# Patient Record
Sex: FEMALE | Race: WHITE | NOT HISPANIC OR LATINO | Employment: UNEMPLOYED | URBAN - METROPOLITAN AREA
[De-identification: names, ages, dates, MRNs, and addresses within clinical notes are randomized per-mention and may not be internally consistent; named-entity substitution may affect disease eponyms.]

---

## 2017-08-03 RX ORDER — SERTRALINE HYDROCHLORIDE 100 MG/1
100 TABLET, FILM COATED ORAL DAILY
Qty: 30 TABLET | Refills: 11 | Status: SHIPPED | OUTPATIENT
Start: 2017-08-03 | End: 2018-07-27 | Stop reason: SDUPTHER

## 2017-08-03 RX ORDER — HYDROXYZINE PAMOATE 50 MG/1
CAPSULE ORAL
Refills: 0 | COMMUNITY
Start: 2017-05-10

## 2017-08-03 RX ORDER — HYDROXYZINE PAMOATE 50 MG/1
CAPSULE ORAL
Qty: 30 CAPSULE | Refills: 0 | OUTPATIENT
Start: 2017-08-03

## 2017-08-03 RX ORDER — LEVOTHYROXINE SODIUM 25 UG/1
25 TABLET ORAL DAILY
Qty: 30 TABLET | Refills: 11 | Status: SHIPPED | OUTPATIENT
Start: 2017-08-03 | End: 2018-07-27 | Stop reason: SDUPTHER

## 2017-10-09 RX ORDER — GABAPENTIN 400 MG/1
CAPSULE ORAL
Qty: 120 CAPSULE | Refills: 0 | Status: SHIPPED | OUTPATIENT
Start: 2017-10-09

## 2017-10-27 RX ORDER — GABAPENTIN 400 MG/1
CAPSULE ORAL
Qty: 120 CAPSULE | Refills: 0 | OUTPATIENT
Start: 2017-10-27

## 2017-11-21 RX ORDER — GABAPENTIN 400 MG/1
CAPSULE ORAL
Qty: 120 CAPSULE | Refills: 0 | OUTPATIENT
Start: 2017-11-21

## 2017-11-21 NOTE — TELEPHONE ENCOUNTER
----- Message from Reyna Best sent at 11/21/2017 10:52 AM CST -----  Contact: patient  Patient called requesting refill on gabapentin (NEURONTIN) 400 MG capsule and nasal spray send to [facility] pharmacy. Please call back at [phone] when script has been sent. Thanks,

## 2017-11-29 ENCOUNTER — TELEPHONE (OUTPATIENT)
Dept: PRIMARY CARE CLINIC | Facility: CLINIC | Age: 62
End: 2017-11-29

## 2017-11-29 NOTE — TELEPHONE ENCOUNTER
----- Message from Leonarda Tinajero sent at 11/28/2017  4:04 PM CST -----  Contact: Patient  Keira, patient [phone], Second time calling for refills on Rx    gabapentin (NEURONTIN) 400 MG capsule 120 capsule   Antihistamine nasal spray (doesn't know name)    Please advise. Thanks.      [facility] - [facility] [address]  [address]   [address] [place] [address]  Phone: [phone] Fax: [phone]

## 2018-07-27 RX ORDER — SERTRALINE HYDROCHLORIDE 100 MG/1
TABLET, FILM COATED ORAL
Qty: 30 TABLET | Refills: 11 | Status: SHIPPED | OUTPATIENT
Start: 2018-07-27

## 2018-07-27 RX ORDER — LEVOTHYROXINE SODIUM 25 UG/1
TABLET ORAL
Qty: 30 TABLET | Refills: 11 | Status: SHIPPED | OUTPATIENT
Start: 2018-07-27

## 2022-07-15 ENCOUNTER — NURSE TRIAGE (OUTPATIENT)
Dept: ADMINISTRATIVE | Facility: CLINIC | Age: 67
End: 2022-07-15

## 2022-07-16 NOTE — TELEPHONE ENCOUNTER
"  Reason for Disposition   [1] Age > 40 AND [2] no obvious cause AND [3] pain even when not moving the arm    (Exception: pain is clearly made worse by moving arm or bending neck)    Additional Information   Negative: Shock suspected (e.g., cold/pale/clammy skin, too weak to stand, low BP, rapid pulse)   Negative: [1] Similar pain previously AND [2] it was from "heart attack"   Negative: [1] Similar pain previously AND [2] it was from "angina" AND [3] not relieved by nitroglycerin   Negative: Sounds like a life-threatening emergency to the triager   Negative: Followed an arm injury   Negative: Chest pain   Negative: Pain, redness, or swelling at intravenous (IV) site or along course of vein   Negative: Wound looks infected   Negative: Elbow pain is main symptom   Negative: Wrist pain is main symptom   Negative: Difficulty breathing or unusual sweating (e.g., sweating without exertion)   Negative: [1] Age > 40 AND [2] associated chest or jaw pain AND [3] pain lasts > 5 minutes    Protocols used: ARM PAIN-A-AH   pt called re been having pblm with L arm swollen/pain, seen at [facility] 2-3 weeks ago. told to see ortho. swelling was main thing now is worse pain. first appt 8/14. pain goes down to hand and shoulder to neck. swelling gone down. few years ago fell and running and tripped and hit sofa and broke thumb. rec ED. Pt refused. Son had 800 mg lauro and thought to be rotator cuff. Pt states she will take 1/4 tab. Took 1/2 last pm. Pt on tylenol and excedrin. Pt states she will go to ED. Call back with questions.   "

## 2023-10-05 ENCOUNTER — HOSPITAL ENCOUNTER (OUTPATIENT)
Facility: HOSPITAL | Age: 68
Discharge: HOME | End: 2023-10-05
Attending: EMERGENCY MEDICINE | Admitting: STUDENT IN AN ORGANIZED HEALTH CARE EDUCATION/TRAINING PROGRAM
Payer: MEDICARE

## 2023-10-05 VITALS
DIASTOLIC BLOOD PRESSURE: 57 MMHG | SYSTOLIC BLOOD PRESSURE: 107 MMHG | WEIGHT: 119.81 LBS | OXYGEN SATURATION: 95 % | HEART RATE: 76 BPM | TEMPERATURE: 99 F | RESPIRATION RATE: 18 BRPM

## 2023-10-05 DIAGNOSIS — R07.9 CHEST PAIN: ICD-10-CM

## 2023-10-05 DIAGNOSIS — K62.3 RECTAL PROLAPSE: Primary | ICD-10-CM

## 2023-10-05 DIAGNOSIS — D75.839 THROMBOCYTOSIS: ICD-10-CM

## 2023-10-05 PROBLEM — F41.9 ANXIETY: Status: ACTIVE | Noted: 2022-08-11

## 2023-10-05 PROBLEM — E03.9 HYPOTHYROIDISM: Status: ACTIVE | Noted: 2022-08-15

## 2023-10-05 PROBLEM — K21.9 GASTROESOPHAGEAL REFLUX DISEASE WITHOUT ESOPHAGITIS: Status: ACTIVE | Noted: 2022-08-11

## 2023-10-05 LAB
ALBUMIN SERPL BCP-MCNC: 4.1 G/DL (ref 3.5–5.2)
ALP SERPL-CCNC: 89 U/L (ref 55–135)
ALT SERPL W/O P-5'-P-CCNC: 16 U/L (ref 10–44)
AMPHET+METHAMPHET UR QL: ABNORMAL
ANION GAP SERPL CALC-SCNC: 15 MMOL/L (ref 8–16)
AST SERPL-CCNC: 26 U/L (ref 10–40)
BARBITURATES UR QL SCN>200 NG/ML: NEGATIVE
BASOPHILS # BLD AUTO: 0.08 K/UL (ref 0–0.2)
BASOPHILS # BLD AUTO: 0.08 K/UL (ref 0–0.2)
BASOPHILS NFR BLD: 0.6 % (ref 0–1.9)
BASOPHILS NFR BLD: 0.7 % (ref 0–1.9)
BENZODIAZ UR QL SCN>200 NG/ML: NEGATIVE
BILIRUB SERPL-MCNC: 0.5 MG/DL (ref 0.1–1)
BUN SERPL-MCNC: 25 MG/DL (ref 8–23)
BZE UR QL SCN: NEGATIVE
CALCIUM SERPL-MCNC: 9.6 MG/DL (ref 8.7–10.5)
CANNABINOIDS UR QL SCN: NEGATIVE
CHLORIDE SERPL-SCNC: 104 MMOL/L (ref 95–110)
CO2 SERPL-SCNC: 23 MMOL/L (ref 23–29)
CREAT SERPL-MCNC: 0.9 MG/DL (ref 0.5–1.4)
CREAT UR-MCNC: 99.6 MG/DL (ref 15–325)
DIFFERENTIAL METHOD: ABNORMAL
DIFFERENTIAL METHOD: ABNORMAL
EOSINOPHIL # BLD AUTO: 0.1 K/UL (ref 0–0.5)
EOSINOPHIL # BLD AUTO: 0.2 K/UL (ref 0–0.5)
EOSINOPHIL NFR BLD: 1.1 % (ref 0–8)
EOSINOPHIL NFR BLD: 1.5 % (ref 0–8)
ERYTHROCYTE [DISTWIDTH] IN BLOOD BY AUTOMATED COUNT: 14.8 % (ref 11.5–14.5)
ERYTHROCYTE [DISTWIDTH] IN BLOOD BY AUTOMATED COUNT: 15 % (ref 11.5–14.5)
EST. GFR  (NO RACE VARIABLE): >60 ML/MIN/1.73 M^2
GLUCOSE SERPL-MCNC: 83 MG/DL (ref 70–110)
HCT VFR BLD AUTO: 36.7 % (ref 37–48.5)
HCT VFR BLD AUTO: 40.2 % (ref 37–48.5)
HGB BLD-MCNC: 11.8 G/DL (ref 12–16)
HGB BLD-MCNC: 12.9 G/DL (ref 12–16)
IMM GRANULOCYTES # BLD AUTO: 0.03 K/UL (ref 0–0.04)
IMM GRANULOCYTES # BLD AUTO: 0.05 K/UL (ref 0–0.04)
IMM GRANULOCYTES NFR BLD AUTO: 0.2 % (ref 0–0.5)
IMM GRANULOCYTES NFR BLD AUTO: 0.4 % (ref 0–0.5)
LIPASE SERPL-CCNC: 6 U/L (ref 4–60)
LYMPHOCYTES # BLD AUTO: 2.3 K/UL (ref 1–4.8)
LYMPHOCYTES # BLD AUTO: 2.5 K/UL (ref 1–4.8)
LYMPHOCYTES NFR BLD: 18.9 % (ref 18–48)
LYMPHOCYTES NFR BLD: 18.9 % (ref 18–48)
MCH RBC QN AUTO: 30.2 PG (ref 27–31)
MCH RBC QN AUTO: 30.3 PG (ref 27–31)
MCHC RBC AUTO-ENTMCNC: 32.1 G/DL (ref 32–36)
MCHC RBC AUTO-ENTMCNC: 32.2 G/DL (ref 32–36)
MCV RBC AUTO: 94 FL (ref 82–98)
MCV RBC AUTO: 94 FL (ref 82–98)
METHADONE UR QL SCN>300 NG/ML: NEGATIVE
MONOCYTES # BLD AUTO: 0.7 K/UL (ref 0.3–1)
MONOCYTES # BLD AUTO: 0.7 K/UL (ref 0.3–1)
MONOCYTES NFR BLD: 5.6 % (ref 4–15)
MONOCYTES NFR BLD: 5.6 % (ref 4–15)
NEUTROPHILS # BLD AUTO: 9 K/UL (ref 1.8–7.7)
NEUTROPHILS # BLD AUTO: 9.5 K/UL (ref 1.8–7.7)
NEUTROPHILS NFR BLD: 73.1 % (ref 38–73)
NEUTROPHILS NFR BLD: 73.4 % (ref 38–73)
NRBC BLD-RTO: 0 /100 WBC
NRBC BLD-RTO: 0 /100 WBC
OPIATES UR QL SCN: ABNORMAL
PCP UR QL SCN>25 NG/ML: NEGATIVE
PLATELET # BLD AUTO: 1148 K/UL (ref 150–450)
PLATELET # BLD AUTO: 1181 K/UL (ref 150–450)
PLATELET BLD QL SMEAR: ABNORMAL
PMV BLD AUTO: 9.1 FL (ref 9.2–12.9)
PMV BLD AUTO: 9.5 FL (ref 9.2–12.9)
POTASSIUM SERPL-SCNC: 4 MMOL/L (ref 3.5–5.1)
PROT SERPL-MCNC: 7.4 G/DL (ref 6–8.4)
RBC # BLD AUTO: 3.89 M/UL (ref 4–5.4)
RBC # BLD AUTO: 4.27 M/UL (ref 4–5.4)
SODIUM SERPL-SCNC: 142 MMOL/L (ref 136–145)
TOXICOLOGY INFORMATION: ABNORMAL
WBC # BLD AUTO: 12.28 K/UL (ref 3.9–12.7)
WBC # BLD AUTO: 12.97 K/UL (ref 3.9–12.7)

## 2023-10-05 PROCEDURE — 85025 COMPLETE CBC W/AUTO DIFF WBC: CPT | Mod: 91 | Performed by: EMERGENCY MEDICINE

## 2023-10-05 PROCEDURE — G0378 HOSPITAL OBSERVATION PER HR: HCPCS

## 2023-10-05 PROCEDURE — A9698 NON-RAD CONTRAST MATERIALNOC: HCPCS | Performed by: EMERGENCY MEDICINE

## 2023-10-05 PROCEDURE — 63600175 PHARM REV CODE 636 W HCPCS: Performed by: EMERGENCY MEDICINE

## 2023-10-05 PROCEDURE — 80053 COMPREHEN METABOLIC PANEL: CPT | Performed by: EMERGENCY MEDICINE

## 2023-10-05 PROCEDURE — 99222 1ST HOSP IP/OBS MODERATE 55: CPT | Mod: ,,, | Performed by: STUDENT IN AN ORGANIZED HEALTH CARE EDUCATION/TRAINING PROGRAM

## 2023-10-05 PROCEDURE — 96376 TX/PRO/DX INJ SAME DRUG ADON: CPT

## 2023-10-05 PROCEDURE — 96375 TX/PRO/DX INJ NEW DRUG ADDON: CPT

## 2023-10-05 PROCEDURE — 83690 ASSAY OF LIPASE: CPT | Performed by: EMERGENCY MEDICINE

## 2023-10-05 PROCEDURE — 80307 DRUG TEST PRSMV CHEM ANLYZR: CPT | Performed by: EMERGENCY MEDICINE

## 2023-10-05 PROCEDURE — 99222 PR INITIAL HOSPITAL CARE,LEVL II: ICD-10-PCS | Mod: ,,, | Performed by: STUDENT IN AN ORGANIZED HEALTH CARE EDUCATION/TRAINING PROGRAM

## 2023-10-05 PROCEDURE — 99285 EMERGENCY DEPT VISIT HI MDM: CPT | Mod: 25

## 2023-10-05 PROCEDURE — 96374 THER/PROPH/DIAG INJ IV PUSH: CPT | Mod: 59

## 2023-10-05 PROCEDURE — P9612 CATHETERIZE FOR URINE SPEC: HCPCS

## 2023-10-05 PROCEDURE — 96361 HYDRATE IV INFUSION ADD-ON: CPT

## 2023-10-05 PROCEDURE — 25500020 PHARM REV CODE 255: Performed by: EMERGENCY MEDICINE

## 2023-10-05 RX ORDER — SODIUM CHLORIDE 0.9 % (FLUSH) 0.9 %
10 SYRINGE (ML) INJECTION EVERY 12 HOURS PRN
Status: DISCONTINUED | OUTPATIENT
Start: 2023-10-05 | End: 2023-10-06 | Stop reason: HOSPADM

## 2023-10-05 RX ORDER — GLUCAGON 1 MG
1 KIT INJECTION
Status: DISCONTINUED | OUTPATIENT
Start: 2023-10-05 | End: 2023-10-06 | Stop reason: HOSPADM

## 2023-10-05 RX ORDER — GABAPENTIN 300 MG/1
300 CAPSULE ORAL 3 TIMES DAILY
Status: DISCONTINUED | OUTPATIENT
Start: 2023-10-05 | End: 2023-10-05

## 2023-10-05 RX ORDER — ONDANSETRON 2 MG/ML
4 INJECTION INTRAMUSCULAR; INTRAVENOUS
Status: COMPLETED | OUTPATIENT
Start: 2023-10-05 | End: 2023-10-05

## 2023-10-05 RX ORDER — LEVOTHYROXINE SODIUM 25 UG/1
25 TABLET ORAL
Status: DISCONTINUED | OUTPATIENT
Start: 2023-10-06 | End: 2023-10-06 | Stop reason: HOSPADM

## 2023-10-05 RX ORDER — LORAZEPAM 2 MG/ML
1 INJECTION INTRAMUSCULAR
Status: COMPLETED | OUTPATIENT
Start: 2023-10-05 | End: 2023-10-05

## 2023-10-05 RX ORDER — BUPROPION HYDROCHLORIDE 150 MG/1
300 TABLET ORAL
COMMUNITY
Start: 2023-10-05

## 2023-10-05 RX ORDER — BUSPIRONE HYDROCHLORIDE 5 MG/1
5 TABLET ORAL 3 TIMES DAILY
Status: DISCONTINUED | OUTPATIENT
Start: 2023-10-05 | End: 2023-10-05

## 2023-10-05 RX ORDER — BUPROPION HYDROCHLORIDE 150 MG/1
300 TABLET ORAL DAILY
Status: DISCONTINUED | OUTPATIENT
Start: 2023-10-06 | End: 2023-10-06 | Stop reason: HOSPADM

## 2023-10-05 RX ORDER — MORPHINE SULFATE 4 MG/ML
4 INJECTION, SOLUTION INTRAMUSCULAR; INTRAVENOUS
Status: COMPLETED | OUTPATIENT
Start: 2023-10-05 | End: 2023-10-05

## 2023-10-05 RX ORDER — PANTOPRAZOLE SODIUM 40 MG/1
1 TABLET, DELAYED RELEASE ORAL DAILY
COMMUNITY
Start: 2023-09-06

## 2023-10-05 RX ORDER — ONDANSETRON HYDROCHLORIDE 8 MG/1
1 TABLET, FILM COATED ORAL EVERY 8 HOURS PRN
COMMUNITY
Start: 2023-10-03

## 2023-10-05 RX ORDER — IBUPROFEN 200 MG
16 TABLET ORAL
Status: DISCONTINUED | OUTPATIENT
Start: 2023-10-05 | End: 2023-10-06 | Stop reason: HOSPADM

## 2023-10-05 RX ORDER — NALOXONE HCL 0.4 MG/ML
0.02 VIAL (ML) INJECTION
Status: DISCONTINUED | OUTPATIENT
Start: 2023-10-05 | End: 2023-10-06 | Stop reason: HOSPADM

## 2023-10-05 RX ORDER — MORPHINE SULFATE 4 MG/ML
2 INJECTION, SOLUTION INTRAMUSCULAR; INTRAVENOUS EVERY 4 HOURS PRN
Status: DISCONTINUED | OUTPATIENT
Start: 2023-10-05 | End: 2023-10-06 | Stop reason: HOSPADM

## 2023-10-05 RX ORDER — BUSPIRONE HYDROCHLORIDE 5 MG/1
5 TABLET ORAL 3 TIMES DAILY
COMMUNITY
Start: 2023-10-05

## 2023-10-05 RX ORDER — IBUPROFEN 200 MG
24 TABLET ORAL
Status: DISCONTINUED | OUTPATIENT
Start: 2023-10-05 | End: 2023-10-06 | Stop reason: HOSPADM

## 2023-10-05 RX ORDER — ACETAMINOPHEN 325 MG/1
650 TABLET ORAL EVERY 8 HOURS PRN
Status: DISCONTINUED | OUTPATIENT
Start: 2023-10-05 | End: 2023-10-06 | Stop reason: HOSPADM

## 2023-10-05 RX ADMIN — IOHEXOL 100 ML: 350 INJECTION, SOLUTION INTRAVENOUS at 04:10

## 2023-10-05 RX ADMIN — SODIUM CHLORIDE, POTASSIUM CHLORIDE, SODIUM LACTATE AND CALCIUM CHLORIDE 1000 ML: 600; 310; 30; 20 INJECTION, SOLUTION INTRAVENOUS at 08:10

## 2023-10-05 RX ADMIN — MORPHINE SULFATE 4 MG: 4 INJECTION INTRAVENOUS at 02:10

## 2023-10-05 RX ADMIN — MORPHINE SULFATE 4 MG: 4 INJECTION INTRAVENOUS at 05:10

## 2023-10-05 RX ADMIN — IOHEXOL 500 ML: 9 SOLUTION ORAL at 04:10

## 2023-10-05 RX ADMIN — LORAZEPAM 1 MG: 2 INJECTION INTRAMUSCULAR; INTRAVENOUS at 05:10

## 2023-10-05 RX ADMIN — ONDANSETRON 4 MG: 2 INJECTION INTRAMUSCULAR; INTRAVENOUS at 02:10

## 2023-10-05 NOTE — Clinical Note
Diagnosis: Rectal prolapse [569.1.ICD-9-CM]   Future Attending Provider: SAMI POSADA [[id number]]   Admitting Provider:: SAMI POSADA [[id number]]

## 2023-10-05 NOTE — ED PROVIDER NOTES
"SCRIBE #1 NOTE: I, Efrainolu Howard, am scribing for, and in the presence of, Kristen Salazar DO. I have scribed the entire note.      History      Chief Complaint   Patient presents with    Rectal Pain     Pt states there is a ball "sticking out" of her rectum       Review of patient's allergies indicates:  No Known Allergies     HPI   HPI    10/5/2023, 2:06 PM   History obtained from the patient      History of Present Illness: Keira Acosta is a 68 y.o. female patient who presents to the Emergency Department for rectal pain, onset last night. Pt describes the pain as a burning sensation. Pt states that she has been constipated for the past several days, having to strain to have a BM. Reports 4 vaginal deliveries. Symptoms are constant and severe. No mitigating or exacerbating factors reported. Associated sxs include constipation and nausea. Patient denies any fever, vomiting, SOB, CP, abdominal pain, and all other sxs at this time. No prior Tx reported. No further complaints or concerns at this time.       Arrival mode: EMS     PCP: No, Primary Doctor       Past Medical History:  Past Medical History:   Diagnosis Date    Acid reflux     Carcinoma of uterus     Depression     Diverticulosis     Hypoglycemia     Hypothyroid     Migraine headache     Panic attacks     Stomach ulcer     Thyroid disease        Past Surgical History:  Past Surgical History:   Procedure Laterality Date    HYSTERECTOMY      PARTIAL HYSTERECTOMY      TONSILLECTOMY           Family History:  Family History   Problem Relation Age of Onset    Cancer Mother     Cancer Father        Social History:  Social History     Tobacco Use    Smoking status: Every Day     Current packs/day: 0.50     Types: Cigarettes    Smokeless tobacco: Not on file   Substance and Sexual Activity    Alcohol use: No    Drug use: No    Sexual activity: Not on file       ROS   Review of Systems   Constitutional:  Negative for fever.   Respiratory:  Negative for shortness " of breath.    Cardiovascular:  Negative for chest pain.   Gastrointestinal:  Positive for constipation, nausea and rectal pain (burning). Negative for vomiting.     Physical Exam      Initial Vitals [10/05/23 1117]   BP Pulse Resp Temp SpO2   126/68 82 18 98.4 °F (36.9 °C) 98 %      MAP       --          Physical Exam  Nursing Notes and Vital Signs Reviewed.  Constitutional: Patient is in mild distress. Well-developed and well-nourished.  Head: Atraumatic. Normocephalic.  Eyes: PERRL. EOM intact. Conjunctivae are not pale. No scleral icterus.  ENT: Mucous membranes are moist. Oropharynx is clear and symmetric.    Neck: Supple. Full ROM.  Cardiovascular: Regular rate. Regular rhythm. No murmurs, rubs, or gallops. Distal pulses are 2+ and symmetric.  Pulmonary/Chest: No respiratory distress. Clear to auscultation bilaterally. No wheezing or rales.  Rectal: Female chaperone present for the duration of the rectal exam. Rectal prolapse with areas of irritation to the rectal mucosa with scant bleeding, + flatus.   Abdominal: Soft and non-distended.  There is no tenderness.  No rebound, guarding, or rigidity.   Musculoskeletal: Moves all extremities. No obvious deformities. No edema.  Skin: Warm and dry.  Neurological:  Alert, awake, and anxious.  No acute focal neurological deficits are appreciated.  Psychiatric: Anxious.    ED Course    Procedures  ED Vital Signs:  Vitals:    10/05/23 1117 10/05/23 1209 10/05/23 1445 10/05/23 1555   BP: 126/68 131/70  110/68   Pulse: 82 83  79   Resp: 18 17 17 19   Temp: 98.4 °F (36.9 °C)   98.6 °F (37 °C)   TempSrc: Oral   Oral   SpO2: 98% 99%  98%   Weight:  54.3 kg (119 lb 12.8 oz)      10/05/23 1615 10/05/23 1700 10/05/23 1721 10/05/23 1748   BP: (!) 127/58 120/67  126/67   Pulse: 83 80  83   Resp: 18 18 18 18   Temp:  98.7 °F (37.1 °C)  98.6 °F (37 °C)   TempSrc:  Oral  Oral   SpO2: 98% 96%     Weight:        10/05/23 2048 10/05/23 2202   BP: 120/65 (!) 107/57   Pulse: 76 76   Resp:      Temp:     TempSrc:     SpO2: 95% 95%   Weight:         Abnormal Lab Results:  Labs Reviewed   CBC W/ AUTO DIFFERENTIAL - Abnormal; Notable for the following components:       Result Value    WBC 12.97 (*)     RDW 15.0 (*)     Platelets 1,148 (*)     MPV 9.1 (*)     Gran # (ANC) 9.5 (*)     Immature Grans (Abs) 0.05 (*)     Gran % 73.4 (*)     Platelet Estimate Increased (*)     All other components within normal limits    Narrative:     PLT critical result(s) called and verbal readback obtained from   GIFTY SHABAZZ RN by [employer] 10/05/2023 14:11   COMPREHENSIVE METABOLIC PANEL - Abnormal; Notable for the following components:    BUN 25 (*)     All other components within normal limits   CBC W/ AUTO DIFFERENTIAL - Abnormal; Notable for the following components:    RBC 3.89 (*)     Hemoglobin 11.8 (*)     Hematocrit 36.7 (*)     RDW 14.8 (*)     Platelets 1,181 (*)     Gran # (ANC) 9.0 (*)     Gran % 73.1 (*)     All other components within normal limits    Narrative:     PLT critical result(s) called and verbal readback obtained from   TYLER RIVERA RN by [employer] 10/05/2023 15:17   DRUG SCREEN PANEL, URINE EMERGENCY - Abnormal; Notable for the following components:    Opiate Scrn, Ur Presumptive Positive (*)     Amphetamine Screen, Ur Presumptive Positive (*)     All other components within normal limits    Narrative:     Specimen Source->Urine   LIPASE   LIPASE   TSH        All Lab Results:  Results for orders placed or performed during the hospital encounter of 10/05/23   CBC auto differential   Result Value Ref Range    WBC 12.97 (H) 3.90 - 12.70 K/uL    RBC 4.27 4.00 - 5.40 M/uL    Hemoglobin 12.9 12.0 - 16.0 g/dL    Hematocrit 40.2 37.0 - 48.5 %    MCV 94 82 - 98 fL    MCH 30.2 27.0 - 31.0 pg    MCHC 32.1 32.0 - 36.0 g/dL    RDW 15.0 (H) 11.5 - 14.5 %    Platelets 1,148 (HH) 150 - 450 K/uL    MPV 9.1 (L) 9.2 - 12.9 fL    Immature Granulocytes 0.4 0.0 - 0.5 %    Gran # (ANC) 9.5 (H) 1.8 - 7.7 K/uL    Immature  Grans (Abs) 0.05 (H) 0.00 - 0.04 K/uL    Lymph # 2.5 1.0 - 4.8 K/uL    Mono # 0.7 0.3 - 1.0 K/uL    Eos # 0.1 0.0 - 0.5 K/uL    Baso # 0.08 0.00 - 0.20 K/uL    nRBC 0 0 /100 WBC    Gran % 73.4 (H) 38.0 - 73.0 %    Lymph % 18.9 18.0 - 48.0 %    Mono % 5.6 4.0 - 15.0 %    Eosinophil % 1.1 0.0 - 8.0 %    Basophil % 0.6 0.0 - 1.9 %    Platelet Estimate Increased (A)     Differential Method Automated    Comprehensive metabolic panel   Result Value Ref Range    Sodium 142 136 - 145 mmol/L    Potassium 4.0 3.5 - 5.1 mmol/L    Chloride 104 95 - 110 mmol/L    CO2 23 23 - 29 mmol/L    Glucose 83 70 - 110 mg/dL    BUN 25 (H) 8 - 23 mg/dL    Creatinine 0.9 0.5 - 1.4 mg/dL    Calcium 9.6 8.7 - 10.5 mg/dL    Total Protein 7.4 6.0 - 8.4 g/dL    Albumin 4.1 3.5 - 5.2 g/dL    Total Bilirubin 0.5 0.1 - 1.0 mg/dL    Alkaline Phosphatase 89 55 - 135 U/L    AST 26 10 - 40 U/L    ALT 16 10 - 44 U/L    eGFR >60 >60 mL/min/1.73 m^2    Anion Gap 15 8 - 16 mmol/L   CBC auto differential   Result Value Ref Range    WBC 12.28 3.90 - 12.70 K/uL    RBC 3.89 (L) 4.00 - 5.40 M/uL    Hemoglobin 11.8 (L) 12.0 - 16.0 g/dL    Hematocrit 36.7 (L) 37.0 - 48.5 %    MCV 94 82 - 98 fL    MCH 30.3 27.0 - 31.0 pg    MCHC 32.2 32.0 - 36.0 g/dL    RDW 14.8 (H) 11.5 - 14.5 %    Platelets 1,181 (HH) 150 - 450 K/uL    MPV 9.5 9.2 - 12.9 fL    Immature Granulocytes 0.2 0.0 - 0.5 %    Gran # (ANC) 9.0 (H) 1.8 - 7.7 K/uL    Immature Grans (Abs) 0.03 0.00 - 0.04 K/uL    Lymph # 2.3 1.0 - 4.8 K/uL    Mono # 0.7 0.3 - 1.0 K/uL    Eos # 0.2 0.0 - 0.5 K/uL    Baso # 0.08 0.00 - 0.20 K/uL    nRBC 0 0 /100 WBC    Gran % 73.1 (H) 38.0 - 73.0 %    Lymph % 18.9 18.0 - 48.0 %    Mono % 5.6 4.0 - 15.0 %    Eosinophil % 1.5 0.0 - 8.0 %    Basophil % 0.7 0.0 - 1.9 %    Differential Method Automated    Lipase   Result Value Ref Range    Lipase 6 4 - 60 U/L   Drug screen panel, emergency   Result Value Ref Range    Benzodiazepines Negative Negative    Methadone metabolites Negative  Negative    Cocaine (Metab.) Negative Negative    Opiate Scrn, Ur Presumptive Positive (A) Negative    Barbiturate Screen, Ur Negative Negative    Amphetamine Screen, Ur Presumptive Positive (A) Negative    THC Negative Negative    Phencyclidine Negative Negative    Creatinine, Urine 99.6 15.0 - 325.0 mg/dL    Toxicology Information SEE COMMENT      Imaging Results:  Imaging Results               CT Abdomen Pelvis With Contrast (Final result)  Result time 10/05/23 16:44:22      Final result by Kingston Diaz MD (10/05/23 16:44:22)                   Impression:      Ongoing rectal prolapse as above.    Gallbladder distension of unclear significance.  Common bile duct dilation.  Outpatient consideration of ERCP depending on symptoms.  Correlation with prior imaging to establish stability should be considered.    Complete findings as above.    This report was flagged in Epic as abnormal.    All CT scans at this facility are performed  using dose modulation techniques as appropriate to performed exam including the following:  automated exposure control; adjustment of mA and/or kV according to the patients size (this includes techniques or standardized protocols for targeted exams where dose is matched to indication/reason for exam: i.e. extremities or head);  iterative reconstruction technique.      Electronically signed by: Kingston Diaz  Date:    10/05/2023  Time:    16:44               Narrative:    EXAMINATION:  CT ABDOMEN PELVIS WITH CONTRAST    CLINICAL HISTORY:  Bowel obstruction suspected;rectal prolapse;    TECHNIQUE:  Low dose axial images, sagittal and coronal reformations were obtained from the lung bases to the pubic symphysis.  Contrast was not administered.    COMPARISON:  None    FINDINGS:  Heart: Normal in size. No pericardial effusion.    Lung Bases: Well aerated, without consolidation or pleural fluid.    Liver: Normal in size and attenuation, with no focal hepatic lesions.    Gallbladder and bile  ducts: Gallbladder distension of unclear significance.  Common bile duct dilation up to 11 mm.  No sludge or stones.  No definite pancreatic duct dilation.    Pancreas: No mass or peripancreatic fat stranding.    Spleen: Unremarkable.    Adrenals: Unremarkable.    Kidneys/ Ureters: Renal simple cysts.  Symmetric enhancement.    Bladder: No evidence of wall thickening.    Reproductive organs: Uterus is surgically absent.    GI Tract/Mesentery: Rectal prolapse both of a portion of the rectum and of the perirectal fat.  Diverticulosis without definite diverticulitis.  Some distal colonic wall thickening possibly circular muscle hypertrophy but overall nonspecific, affecting a moderate length segment of the distal colon.    Peritoneal Space: No ascites. No free air.    Retroperitoneum: No significant adenopathy.    Abdominal wall: Unremarkable.    Vasculature: No significant atherosclerosis or aneurysm.    Bones: No acute fracture.  Multifocal osseous degenerative changes.                                              The Emergency Provider reviewed the vital signs and test results, which are outlined above.    ED Discussion     5:41 PM: Attempted to contact Dr. Gabriel (General Surgery) via Secure Chat at this time, pending response.    7:14 PM: Discussed pt's case with Dr. Gabriel (General Surgery) who recommends coating the prolapse with sugar and attempting reduction. If reduction is unsuccessful, Dr. Gabriel recommends transferring the pt for Colon and Rectal Surgery services.     7:28 PM: Discussed pt's case with Dr. Farris (Gastroenterology), who will contact Dr. Ryan (Colon and Rectal Surgery) to see if he can get colorectal to evaluate the pt.    7:52 PM: Discussed pt's case with Dr. Lerma (Colon and Rectal Surgery) who recommends admission to Hospital Medicine. Dr. Lerma will come evaluate the pt at bedside and attempt prolapse reduction.    7:53 PM: Discussed case with Dr. Gates (Hospital Medicine).   Yajaira agrees with current care and management of pt and accepts admission.   Admitting Service: Hospital Medicine  Admitting Physician: Dr. Gates  Admit to: Obs    7:54 PM: Re-evaluated pt. I have discussed test results, shared treatment plan, and the need for admission with patient and family at bedside. Pt and family express understanding at this time and agree with all information. All questions answered. Pt and family have no further questions or concerns at this time. Pt is ready for admit.      ED Course as of 10/09/23 1023   Thu Oct 05, 2023   1701 Platelet Count(!!): 1,181 [NF]   1859 68-year-old female presents with rectal prolapse.  I am unable to reduce in the emergency department despite Trendelenburg position, ice, parenteral pain medication and anxiolytics, and direct pressure.  Spoke with colorectal surgery who is coming to evaluate and attempt reduction.  Request admission to hospital medicine.  Additionally patient has chronic worsening thrombocytosis. She also has a mild decrease in her H&H but nothing to indicate hemorrhage.  Normal renal function, LFTs, and electrolytes.  CT abdomen and pelvis with IV and p.o. contrast show persistent rectal prolapse.  [NF]   1951 Lipase: 6  Negative for pancreatitis [NF]      ED Course User Index  [NF] Kristen Salazar, DO       ED Medication(s):  Medications   morphine injection 4 mg (4 mg Intravenous Given 10/5/23 1445)   ondansetron injection 4 mg (4 mg Intravenous Given 10/5/23 1444)   iohexoL (OMNIPAQUE 350) injection 100 mL (100 mLs Intravenous Given 10/5/23 1617)   iohexoL (OMNIPAQUE 9) oral solution 500 mL (500 mLs Oral Given 10/5/23 1617)   LORazepam injection 1 mg (1 mg Intravenous Given 10/5/23 1721)   morphine injection 4 mg (4 mg Intravenous Given 10/5/23 1721)   lactated ringers bolus 1,000 mL (0 mLs Intravenous Stopped 10/5/23 2208)     Discharge Medication List as of 10/5/2023  9:20 PM        Medical Decision Making    Medical Decision  Making  Amount and/or Complexity of Data Reviewed  Labs: ordered. Decision-making details documented in ED Course.  Radiology: ordered and independent interpretation performed. Decision-making details documented in ED Course.  Discussion of management or test interpretation with external provider(s): Gen sx, colorectal, HM    Risk  OTC drugs.  Prescription drug management.  Parenteral controlled substances.  Decision regarding hospitalization.  Minor surgery with identified risk factors.                Scribe Attestation:   Scribe #1: I performed the above scribed service and the documentation accurately describes the services I performed. I attest to the accuracy of the note.    Attending:   Physician Attestation Statement for Scribe #1: I, Kristen Salazar DO, personally performed the services described in this documentation, as scribed by Efrain Howard, in my presence, and it is both accurate and complete.          Clinical Impression       ICD-10-CM ICD-9-CM   1. Rectal prolapse  K62.3 569.1         2. Thrombocytosis  D75.839 238.71       Disposition:   Disposition: Placed in Observation  Condition: Stable         Kristen Salazar DO  10/09/23 1022       Kristen Salazar DO  10/09/23 1023

## 2023-10-06 NOTE — DISCHARGE INSTRUCTIONS
Referral to hematology/oncology requested because of elevated platelet count. Follow up with them and PCP as needed. Return to ED for worsening symptoms.

## 2023-10-06 NOTE — CONSULTS
[facility] - Emergency Dept.  Colorectal Surgery  Consult Note    Patient Name: Keira Acosta  MRN: [id number]  Admission Date: 10/5/2023  Hospital Length of Stay: 0 days  Attending Physician: Flavio Gates MD  Primary Care Provider: Lucita Primary Doctor    Inpatient consult to Colorectal Surgery  Consult performed by: Shirley Lerma MD  Consult ordered by: Flavio Gates MD        Subjective:     Chief Complaint/Reason for Admission: rectal prolapse    History of Present Illness: pt obtunded on exam. Unable to obtain history. Per ED MD rectal prolapse has been prolapsed for weeks and pt unable to reduce. Pt presented altered and combative.    Current Facility-Administered Medications   Medication    acetaminophen tablet 650 mg    [START ON 10/6/2023] buPROPion TB24 tablet 300 mg    busPIRone tablet 5 mg    dextrose 10% bolus 125 mL 125 mL    dextrose 10% bolus 250 mL 250 mL    gabapentin capsule 300 mg    glucagon (human recombinant) injection 1 mg    glucose chewable tablet 16 g    glucose chewable tablet 24 g    lactated ringers bolus 1,000 mL    [START ON 10/6/2023] levothyroxine tablet 25 mcg    morphine injection 2 mg    naloxone 0.4 mg/mL injection 0.02 mg    sodium chloride 0.9% flush 10 mL     Current Outpatient Medications   Medication Sig    ASPIRIN/ACETAMINOPHEN/CAFFEINE (EXCEDRIN MIGRAINE ORAL) Take by mouth.    buPROPion (WELLBUTRIN XL) 150 MG TB24 tablet Take 300 mg by mouth.    busPIRone (BUSPAR) 5 MG Tab Take 5 mg by mouth 3 (three) times daily.    carisoprodol (SOMA) 350 MG tablet Take 350 mg by mouth 4 (four) times daily as needed.    CETIRIZINE HCL/PSEUDOEPHEDRINE (ZYRTEC-D ORAL) Take by mouth.    chromium 100 mcg Tab Take by mouth.    gabapentin (NEURONTIN) 400 MG capsule TAKE 1 CAPSULE BY MOUTH 4 TIMES A DAY    guar gum (BENEFIBER, GUAR GUM,) Pack Take by mouth.    hydrOXYzine pamoate (VISTARIL) 50 MG Cap TAKE 1 CAPSULE BY MOUTH EVERY 4 TO 6 HOURS IF NEEDED    ibuprofen (ADVIL,MOTRIN)  200 MG tablet Take 200 mg by mouth every 6 (six) hours as needed.    levothyroxine (SYNTHROID) 25 MCG tablet TAKE 1 TABLET BY MOUTH EVERY DAY    ondansetron (ZOFRAN) 8 MG tablet Take 1 tablet by mouth every 8 (eight) hours as needed.    pantoprazole (PROTONIX) 40 MG tablet Take 1 tablet by mouth once daily.    pseudoephedrine (SUDAFED) 120 mg 12 hr tablet Take 120 mg by mouth every 12 (twelve) hours.    aspirin 325 MG tablet Take 325 mg by mouth once daily.    CLONIDINE HCL (CLONIDINE ORAL) Take by mouth.    diphenhydrAMINE (BENADRYL) 25 mg capsule Take 12.5 mg by mouth every 6 (six) hours as needed.    DOCOSAHEXANOIC ACID/EPA (FISH OIL ORAL) Take by mouth.    gabapentin (NEURONTIN) 300 MG capsule Take 300 mg by mouth 4 (four) times daily.    garlic 1 mg Cap Take by mouth.    hydrocodone-acetaminophen  (LORTAB)  mg per tablet Take 1 tablet by mouth every 4 (four) hours as needed.    hyoscyamine (ANASPAZ,LEVSIN) 0.125 mg Tab Take 1 tablet (125 mcg total) by mouth every 6 (six) hours as needed.    sertraline (ZOLOFT) 100 MG tablet TAKE 1 TABLET BY MOUTH EVERY DAY       Review of patient's allergies indicates:  No Known Allergies    Past Medical History:   Diagnosis Date    Acid reflux     Carcinoma of uterus     Depression     Diverticulosis     Hypoglycemia     Hypothyroid     Migraine headache     Panic attacks     Stomach ulcer     Thyroid disease      Past Surgical History:   Procedure Laterality Date    HYSTERECTOMY      PARTIAL HYSTERECTOMY      TONSILLECTOMY       Family History       Problem Relation (Age of Onset)    Cancer Mother, Father          Tobacco Use    Smoking status: Every Day     Current packs/day: 0.50     Types: Cigarettes    Smokeless tobacco: Not on file   Substance and Sexual Activity    Alcohol use: No    Drug use: No    Sexual activity: Not on file     Review of Systems   Unable to perform ROS: Mental status change     Objective:     Vital Signs (Most Recent):  Temp: 98.6 °F (37  °C) (10/05/23 1748)  Pulse: 83 (10/05/23 1748)  Resp: 18 (10/05/23 1748)  BP: 126/67 (10/05/23 1748)  SpO2: 96 % (10/05/23 1700) Vital Signs (24h Range):  Temp:  [98.4 °F (36.9 °C)-98.7 °F (37.1 °C)] 98.6 °F (37 °C)  Pulse:  [79-83] 83  Resp:  [17-19] 18  SpO2:  [96 %-99 %] 96 %  BP: (110-131)/(58-70) 126/67     Weight: 54.3 kg (119 lb 12.8 oz)  There is no height or weight on file to calculate BMI.    Physical Exam  Constitutional:       Appearance: She is well-developed. She is ill-appearing and toxic-appearing.   Neck:      Thyroid: No thyromegaly.   Cardiovascular:      Rate and Rhythm: Normal rate and regular rhythm.   Pulmonary:      Effort: Pulmonary effort is normal. No respiratory distress.   Abdominal:      General: There is no distension.      Palpations: Abdomen is soft. There is no mass.      Tenderness: There is no abdominal tenderness.      Comments: Rectal prolapse with circumferential mucosal irritation, chronic mucosal changes. Reducible with manual pressure with moderate effort. Rectum did not re prolapse after reduction   Musculoskeletal:         General: No tenderness.   Neurological:      Mental Status: She is disoriented.           All pertinent lab results within the last 24 hours have been reviewed.     Significant Diagnostics:  CT: I have reviewed all pertinent results/findings within the past 24 hours:  GB distention without cholecystitis, CBD 11mm , rectal prolapse with distal colonic thickening    Assessment/Plan:     Active Diagnoses:    Diagnosis Date Noted POA    Rectal prolapse [K62.3] 10/05/2023 Unknown      Problems Resolved During this Admission:       Pt with AMS, rectal prolapse, GB distention and CBD 11mm on CT    Thank you for your consult.     - rectal prolapse reduced. Chronic appearing changes and patient not obstructed as MD observed flatus and BM   - chronic mucosal changes are not ischemic  - GB dilation in absence of pericholecystic fluid, RUQ pain, or hx of symptoms.  Would not pursue further workup. Also CBD diameter, - stones with normal Tbili would not pursue workup.   - pt can followup in CRS clinic with me  for management of rectal prolapse if she desires  - no indication for admission from surgical standpoint  - please call with questions or concerns    Shirley Lerma MD  Colorectal Surgery  [facility] - Emergency Dept.

## 2023-10-06 NOTE — CONSULTS
Hospital Medicine Brief Consult Note    Keira Acosta, a 68-year-old woman, presented to the Emergency Department with rectal pain described as a burning sensation that began the previous night. A CT of the abdomen and pelvis with IV and p.o. contrast revealed a persistent rectal prolapse. The patient was administered IV morphine and ativan following unsuccessful prolapse reduction attempts. The Emergency Department consulted Hospital Medicine due to the recommendation from colorectal surgery for admission.     Came to bedside to evaluate but patient was sedated, so the history was corroborated with her daughter, Scot, and admission orders were placed.    Colorectal surgery then assessed the patient and found the prolapse to be chronic, with no obstruction observed. There were no signs of ischemia. They also noted gallbladder dilation without any associated symptoms and no indications for further workup. From the colorectal surgical perspective, there was no need for hospital admission and that the patient could follow up in the colorectal surgery clinic for further management of the rectal prolapse if she chose to.    Given the colorectal surgery's assessment and recommendations, the decision was made to discharge the patient from the ER. I notified patient's daughter, and arrangements were made for her to be picked up from the ER. I also placed an outpatient referral to the hematology clinic for further evaluation of her thrombocytosis.    The Hospital Medicine team has completed its evaluation and will sign off since there is no longer a plan for admission.    ROS: could not obtain due to sedation     Objective  /65   Pulse 76   Temp 98.6 °F (37 °C) (Oral)   Resp 18   Wt 54.3 kg (119 lb 12.8 oz)   SpO2 95%   No intake or output data in the 24 hours ending 10/05/23 8234    PHYSICAL EXAM  Vitals reviewed, normal.  Exam limited due to patient's sedated state  GEN: No acute distress, sedated, body  "habitus underweight  HEENT: atraumatic and normocephalic  CARDS: regular rate and rhythm, no m/g, pulses palpable in LE  PULM: breathing comfortably on room air, chest symmetric, nonlabored, no abnormal breath sounds on auscultation  ABD: nontender, nondistended, soft, no organomegaly, BS+  Neuro: could not reliably assess due to sedation      BMP:   Recent Labs   Lab 10/05/23  1348   GLU 83      K 4.0      CO2 23   BUN 25*   CREATININE 0.9   CALCIUM 9.6     CBC:   Recent Labs   Lab 10/05/23  1348 10/05/23  1452   WBC 12.97* 12.28   HGB 12.9 11.8*   HCT 40.2 36.7*   PLT 1,148* 1,181*     CMP:   Recent Labs   Lab 10/05/23  1348      K 4.0      CO2 23   GLU 83   BUN 25*   CREATININE 0.9   CALCIUM 9.6   PROT 7.4   ALBUMIN 4.1   BILITOT 0.5   ALKPHOS 89   AST 26   ALT 16   ANIONGAP 15     Cardiac Markers: No results for input(s): "CKMB", "MYOGLOBIN", "BNP", "TROPISTAT" in the last 48 hours.  Coagulation: No results for input(s): "PT", "INR", "APTT" in the last 48 hours.  Lactic Acid: No results for input(s): "LACTATE" in the last 48 hours.  Magnesium: No results for input(s): "MG" in the last 48 hours.  Troponin: No results for input(s): "TROPONINI", "TROPONINIHS" in the last 48 hours.  TSH:   No results for input(s): "TSH" in the last 4320 hours.  Urine Studies:   No results for input(s): "COLORU", "APPEARANCEUA", "PHUR", "SPECGRAV", "PROTEINUA", "GLUCUA", "KETONESU", "BILIRUBINUA", "OCCULTUA", "NITRITE", "UROBILINOGEN", "LEUKOCYTESUR", "RBCUA", "WBCUA", "BACTERIA", "SQUAMEPITHEL", "HYALINECASTS" in the last 48 hours.    Invalid input(s): "WRIGHTSUR"    Imaging Results               CT Abdomen Pelvis With Contrast (Final result)  Result time 10/05/23 16:44:22      Final result by Kingston Diaz MD (10/05/23 16:44:22)                   Impression:      Ongoing rectal prolapse as above.    Gallbladder distension of unclear significance.  Common bile duct dilation.  Outpatient consideration of " ERCP depending on symptoms.  Correlation with prior imaging to establish stability should be considered.    Complete findings as above.    This report was flagged in Epic as abnormal.    All CT scans at this facility are performed  using dose modulation techniques as appropriate to performed exam including the following:  automated exposure control; adjustment of mA and/or kV according to the patients size (this includes techniques or standardized protocols for targeted exams where dose is matched to indication/reason for exam: i.e. extremities or head);  iterative reconstruction technique.      Electronically signed by: Kingston Diaz  Date:    10/05/2023  Time:    16:44               Narrative:    EXAMINATION:  CT ABDOMEN PELVIS WITH CONTRAST    CLINICAL HISTORY:  Bowel obstruction suspected;rectal prolapse;    TECHNIQUE:  Low dose axial images, sagittal and coronal reformations were obtained from the lung bases to the pubic symphysis.  Contrast was not administered.    COMPARISON:  None    FINDINGS:  Heart: Normal in size. No pericardial effusion.    Lung Bases: Well aerated, without consolidation or pleural fluid.    Liver: Normal in size and attenuation, with no focal hepatic lesions.    Gallbladder and bile ducts: Gallbladder distension of unclear significance.  Common bile duct dilation up to 11 mm.  No sludge or stones.  No definite pancreatic duct dilation.    Pancreas: No mass or peripancreatic fat stranding.    Spleen: Unremarkable.    Adrenals: Unremarkable.    Kidneys/ Ureters: Renal simple cysts.  Symmetric enhancement.    Bladder: No evidence of wall thickening.    Reproductive organs: Uterus is surgically absent.    GI Tract/Mesentery: Rectal prolapse both of a portion of the rectum and of the perirectal fat.  Diverticulosis without definite diverticulitis.  Some distal colonic wall thickening possibly circular muscle hypertrophy but overall nonspecific, affecting a moderate length segment of the  distal colon.    Peritoneal Space: No ascites. No free air.    Retroperitoneum: No significant adenopathy.    Abdominal wall: Unremarkable.    Vasculature: No significant atherosclerosis or aneurysm.    Bones: No acute fracture.  Multifocal osseous degenerative changes.                                      Assessment/Plan  Rectal Diagnosis     -corrected in the ER by CRS     -cleared for discharge thereafter with outpatient follow up    Thrombocytosis, Chronic     -outpatient ambulatory referral placed for heme onc follow up    Discharge from ER.   Hospital Medicine to sign off     Flavio Gates MD  Department of Hospital Medicine   [facility] - Telemetry ([facility])

## 2023-10-20 ENCOUNTER — TELEPHONE (OUTPATIENT)
Dept: HEMATOLOGY/ONCOLOGY | Facility: CLINIC | Age: 68
End: 2023-10-20
Payer: MEDICARE

## 2023-10-20 ENCOUNTER — PATIENT MESSAGE (OUTPATIENT)
Dept: HEMATOLOGY/ONCOLOGY | Facility: CLINIC | Age: 68
End: 2023-10-20
Payer: MEDICARE

## 2023-10-20 DIAGNOSIS — D64.9 ANEMIA: ICD-10-CM

## 2023-10-20 DIAGNOSIS — D75.839 THROMBOCYTOSIS: Primary | ICD-10-CM

## 2024-12-09 ENCOUNTER — HOSPITAL ENCOUNTER (EMERGENCY)
Facility: HOSPITAL | Age: 69
Discharge: ANOTHER HOSPITAL | End: 2024-12-09
Attending: EMERGENCY MEDICINE
Payer: MEDICARE

## 2024-12-09 VITALS
HEIGHT: 65 IN | DIASTOLIC BLOOD PRESSURE: 60 MMHG | OXYGEN SATURATION: 98 % | SYSTOLIC BLOOD PRESSURE: 124 MMHG | RESPIRATION RATE: 18 BRPM | TEMPERATURE: 98 F | HEART RATE: 70 BPM | WEIGHT: 134.94 LBS | BODY MASS INDEX: 22.48 KG/M2

## 2024-12-09 DIAGNOSIS — M25.551 BILATERAL HIP PAIN: ICD-10-CM

## 2024-12-09 DIAGNOSIS — Z72.0 TOBACCO ABUSE: ICD-10-CM

## 2024-12-09 DIAGNOSIS — S00.83XA CONTUSION OF FOREHEAD, INITIAL ENCOUNTER: ICD-10-CM

## 2024-12-09 DIAGNOSIS — Z91.81 HISTORY OF FALL: ICD-10-CM

## 2024-12-09 DIAGNOSIS — D75.839 THROMBOCYTOSIS: ICD-10-CM

## 2024-12-09 DIAGNOSIS — S12.100A CLOSED ODONTOID FRACTURE, INITIAL ENCOUNTER: Primary | ICD-10-CM

## 2024-12-09 DIAGNOSIS — M25.552 BILATERAL HIP PAIN: ICD-10-CM

## 2024-12-09 DIAGNOSIS — R53.1 WEAKNESS: ICD-10-CM

## 2024-12-09 LAB
ALBUMIN SERPL BCP-MCNC: 3.9 G/DL (ref 3.5–5.2)
ALP SERPL-CCNC: 73 U/L (ref 40–150)
ALT SERPL W/O P-5'-P-CCNC: 23 U/L (ref 10–44)
AMPHET+METHAMPHET UR QL: ABNORMAL
ANION GAP SERPL CALC-SCNC: 14 MMOL/L (ref 8–16)
AST SERPL-CCNC: 34 U/L (ref 10–40)
BARBITURATES UR QL SCN>200 NG/ML: NEGATIVE
BASOPHILS # BLD AUTO: 0.1 K/UL (ref 0–0.2)
BASOPHILS NFR BLD: 1 % (ref 0–1.9)
BENZODIAZ UR QL SCN>200 NG/ML: ABNORMAL
BILIRUB SERPL-MCNC: 0.4 MG/DL (ref 0.1–1)
BILIRUB UR QL STRIP: NEGATIVE
BNP SERPL-MCNC: 79 PG/ML (ref 0–99)
BUN SERPL-MCNC: 28 MG/DL (ref 8–23)
BZE UR QL SCN: NEGATIVE
CALCIUM SERPL-MCNC: 9.9 MG/DL (ref 8.7–10.5)
CANNABINOIDS UR QL SCN: NEGATIVE
CHLORIDE SERPL-SCNC: 108 MMOL/L (ref 95–110)
CK SERPL-CCNC: 492 U/L (ref 20–180)
CLARITY UR: CLEAR
CO2 SERPL-SCNC: 24 MMOL/L (ref 23–29)
COLOR UR: YELLOW
CREAT SERPL-MCNC: 0.8 MG/DL (ref 0.5–1.4)
CREAT UR-MCNC: 68.7 MG/DL (ref 15–325)
DIFFERENTIAL METHOD BLD: ABNORMAL
EOSINOPHIL # BLD AUTO: 0.3 K/UL (ref 0–0.5)
EOSINOPHIL NFR BLD: 2.6 % (ref 0–8)
ERYTHROCYTE [DISTWIDTH] IN BLOOD BY AUTOMATED COUNT: 15.6 % (ref 11.5–14.5)
EST. GFR  (NO RACE VARIABLE): >60 ML/MIN/1.73 M^2
GLUCOSE SERPL-MCNC: 82 MG/DL (ref 70–110)
GLUCOSE UR QL STRIP: NEGATIVE
HCT VFR BLD AUTO: 43.4 % (ref 37–48.5)
HCV AB SERPL QL IA: NEGATIVE
HEP C VIRUS HOLD SPECIMEN: NORMAL
HGB BLD-MCNC: 14 G/DL (ref 12–16)
HGB UR QL STRIP: NEGATIVE
HIV 1+2 AB+HIV1 P24 AG SERPL QL IA: NEGATIVE
IMM GRANULOCYTES # BLD AUTO: 0.03 K/UL (ref 0–0.04)
IMM GRANULOCYTES NFR BLD AUTO: 0.3 % (ref 0–0.5)
KETONES UR QL STRIP: NEGATIVE
LEUKOCYTE ESTERASE UR QL STRIP: NEGATIVE
LYMPHOCYTES # BLD AUTO: 2.1 K/UL (ref 1–4.8)
LYMPHOCYTES NFR BLD: 21.8 % (ref 18–48)
MAGNESIUM SERPL-MCNC: 2.4 MG/DL (ref 1.6–2.6)
MCH RBC QN AUTO: 30.2 PG (ref 27–31)
MCHC RBC AUTO-ENTMCNC: 32.3 G/DL (ref 32–36)
MCV RBC AUTO: 94 FL (ref 82–98)
METHADONE UR QL SCN>300 NG/ML: NEGATIVE
MONOCYTES # BLD AUTO: 0.5 K/UL (ref 0.3–1)
MONOCYTES NFR BLD: 5.3 % (ref 4–15)
NEUTROPHILS # BLD AUTO: 6.6 K/UL (ref 1.8–7.7)
NEUTROPHILS NFR BLD: 69 % (ref 38–73)
NITRITE UR QL STRIP: NEGATIVE
NRBC BLD-RTO: 0 /100 WBC
OPIATES UR QL SCN: NEGATIVE
PCP UR QL SCN>25 NG/ML: NEGATIVE
PH UR STRIP: 7 [PH] (ref 5–8)
PLATELET # BLD AUTO: 1056 K/UL (ref 150–450)
PLATELET BLD QL SMEAR: ABNORMAL
PMV BLD AUTO: 9.1 FL (ref 9.2–12.9)
POTASSIUM SERPL-SCNC: 3.9 MMOL/L (ref 3.5–5.1)
PROT SERPL-MCNC: 7.2 G/DL (ref 6–8.4)
PROT UR QL STRIP: NEGATIVE
RBC # BLD AUTO: 4.64 M/UL (ref 4–5.4)
SODIUM SERPL-SCNC: 146 MMOL/L (ref 136–145)
SP GR UR STRIP: 1.01 (ref 1–1.03)
TOXICOLOGY INFORMATION: ABNORMAL
TROPONIN I SERPL DL<=0.01 NG/ML-MCNC: <0.006 NG/ML (ref 0–0.03)
URN SPEC COLLECT METH UR: NORMAL
UROBILINOGEN UR STRIP-ACNC: NEGATIVE EU/DL
WBC # BLD AUTO: 9.55 K/UL (ref 3.9–12.7)

## 2024-12-09 PROCEDURE — 80053 COMPREHEN METABOLIC PANEL: CPT | Performed by: EMERGENCY MEDICINE

## 2024-12-09 PROCEDURE — 85025 COMPLETE CBC W/AUTO DIFF WBC: CPT | Performed by: EMERGENCY MEDICINE

## 2024-12-09 PROCEDURE — 83880 ASSAY OF NATRIURETIC PEPTIDE: CPT | Performed by: EMERGENCY MEDICINE

## 2024-12-09 PROCEDURE — 82550 ASSAY OF CK (CPK): CPT | Performed by: EMERGENCY MEDICINE

## 2024-12-09 PROCEDURE — 83735 ASSAY OF MAGNESIUM: CPT | Performed by: EMERGENCY MEDICINE

## 2024-12-09 PROCEDURE — 86803 HEPATITIS C AB TEST: CPT | Performed by: EMERGENCY MEDICINE

## 2024-12-09 PROCEDURE — 87389 HIV-1 AG W/HIV-1&-2 AB AG IA: CPT | Performed by: EMERGENCY MEDICINE

## 2024-12-09 PROCEDURE — 99285 EMERGENCY DEPT VISIT HI MDM: CPT | Mod: 25

## 2024-12-09 PROCEDURE — 81003 URINALYSIS AUTO W/O SCOPE: CPT | Mod: 59 | Performed by: EMERGENCY MEDICINE

## 2024-12-09 PROCEDURE — 84484 ASSAY OF TROPONIN QUANT: CPT | Performed by: EMERGENCY MEDICINE

## 2024-12-09 PROCEDURE — 80307 DRUG TEST PRSMV CHEM ANLYZR: CPT | Performed by: EMERGENCY MEDICINE

## 2024-12-09 NOTE — CONSULTS
Santosh was consulted on possible HH for patient for when she is discharged home due to recent falls. Santosh met with patient at bedside who advised she was currently in the ED for a recent fall. Patient advised SW she recently had a fall within the last few days and two months prior. Patient verbalize having pain from her fall months ago and not seeking any medical attention. Patient wears a neck brace she stated she received from a friend who had neck surgery. Patient stated she lives alone and her daughter, Scot Acosta, lives about 5 miles from her. SANTOSH spoke with patient regarding HH services to place in the home in efforts to help with her injuries from her falls,  receive personalized care in the comfort of her own home, maintain independence in daily activities, and avoid unnecessary hospitalizations or ED visits. Patient verbalized not knowing any agencies offhand but agreed to HH. SANTOSH provided patient with a list of agencies for her to choose from. Santosh will check in with patient before release on her decision. Sw will remain available.

## 2024-12-09 NOTE — ED PROVIDER NOTES
"SCRIBE #1 NOTE: I, Shane Cifuentes, am scribing for, and in the presence of, Marjorie Escobedo MD. I have scribed the entire note.       History     Chief Complaint   Patient presents with    Fall     Came in via [facility]. Pt tripped and fell getting off of couch this morning, hit head. Bruising to forehead. No LOC, no blood thinners per pt. Family reports general decline of pt status,  consult due for ADLs     Review of patient's allergies indicates:  No Known Allergies      History of Present Illness     HPI    12/9/2024, 3:28 PM  History obtained from the patient      History of Present Illness: Keira Acosta is a 69 y.o. female patient with a PMHx of hypoglycemia, carcinoma of uterus, hypothyroid, migraines, panic attacks, depression, and diverticulitis who presents to the Emergency Department for evaluation of a fall which pt states occurred yesterday. Pt appears overmedicated upon eval and is a poor historian. Pt reports she hit her lead, leaving her with worsening neck pain and an abrasion/contusion to the R frontal aspect of her forehead. Patient states this fall happened several months ago, she did not go to the hospital at that time and has not been seen by anyone for this.  She has subsequently fallen several other times, most recently today.  There is not any family at bedside. Pt's daughter called [facility], as pt has become increasingly weak and can no longer move around on her own following this most recent fall. Pt reports she has also had 2-3 falls leading to neck pain over the past few months, which she was not seen for. Symptoms are constant and moderate in severity. No mitigating or exacerbating factors reported. Associated sxs include a HA and bilateral ear pain, which the pt states feels like "ice picks." Patient denies any and all other sxs at this time. Denies any chest pain, shortness of breath, no gross motor or sensory weakness in arms or legs, states she feels weak all over. Pt is " not on blood thinners and denies LOC. Pt reports she smokes and denies alcohol usage. Pt takes gabapentin and migraine medicine. No further complaints or concerns at this time.       Arrival mode: [facility]    PCP: No, Primary Doctor        Past Medical History:  Past Medical History:   Diagnosis Date    Acid reflux     Carcinoma of uterus     Depression     Diverticulosis     Hypoglycemia     Hypothyroid     Migraine headache     Panic attacks     Stomach ulcer     Thyroid disease        Past Surgical History:  Past Surgical History:   Procedure Laterality Date    HYSTERECTOMY      PARTIAL HYSTERECTOMY      TONSILLECTOMY           Family History:  Family History   Problem Relation Name Age of Onset    Cancer Mother      Cancer Father         Social History:  Social History     Tobacco Use    Smoking status: Every Day     Current packs/day: 0.50     Types: Cigarettes    Smokeless tobacco: Not on file   Substance and Sexual Activity    Alcohol use: No    Drug use: No    Sexual activity: Not on file        Review of Systems     Review of Systems   Constitutional:  Negative for fever.   HENT:  Positive for ear pain (bilateral). Negative for sore throat.    Respiratory:  Negative for shortness of breath.    Cardiovascular:  Negative for chest pain.   Gastrointestinal:  Negative for nausea.   Genitourinary:  Negative for dysuria.   Musculoskeletal:  Positive for neck pain (radiating up head). Negative for back pain.   Skin:  Positive for wound (abrasion and contusion to R frontal aspect of forehead). Negative for rash.   Neurological:  Positive for weakness and headaches. Negative for syncope.   Hematological:  Does not bruise/bleed easily.   All other systems reviewed and are negative.     Physical Exam     Initial Vitals [12/09/24 1312]   BP Pulse Resp Temp SpO2   (!) 141/76 80 18 97.6 °F (36.4 °C) 99 %      MAP       --          Physical Exam  Nursing Notes and Vital Signs Reviewed.  Constitutional: Patient is in no acute  distress. Poorly kept and disheveled. Poor hygiene with dirt under her nails and on her feet. Seems overmedicated. Poor historian.   Head: Normocephalic. Small abrasion and hematoma to R frontal aspect of forehead.   Eyes: PERRL. EOM intact. Conjunctivae are not pale. No scleral icterus.  ENT: Mucous membranes are moist. Oropharynx is clear and symmetric.    Neck: Supple. Full ROM. No lymphadenopathy.  Reports midline cervical tenderness, no instability or step-offs, no difficulty with range of motion.   Cardiovascular: Regular rate. Regular rhythm. No murmurs, rubs, or gallops. Distal pulses are 2+ and symmetric.  Pulmonary/Chest: No respiratory distress. Diminished bilaterally. No wheezing or rales.  Abdominal: Soft and non-distended.  There is no tenderness.  No rebound, guarding, or rigidity. Good bowel sounds.  Musculoskeletal: Moves all extremities. No edema. No calf tenderness. Clubbing of fingers. Moved extremities w/o difficulty. Neurovascular intact with good pulses and motor function. No midline thoracic or lumbar tenderness.   Skin: Warm and dry.   Neurological:  Drowsy but arousable and conversant, poor historian.   Normal speech.  Equal  strength, no facial droop, No pronator drift with arms or legs out, can hold both against gravity. No acute focal neurological deficits are appreciated.  Psychiatric: Normal affect. Good eye contact. Appropriate in content.      ED Course   Critical Care    Date/Time: 12/9/2024 5:16 PM    Performed by: Marjorie Escobedo MD  Authorized by: Marjorie Escobedo MD  Direct patient critical care time: 40 minutes  Additional history critical care time: 8 minutes  Ordering / reviewing critical care time: 8 minutes  Documentation critical care time: 7 minutes  Consulting other physicians critical care time: 5 minutes  Total critical care time (exclusive of procedural time) : 68 minutes  Critical care was necessary to treat or prevent imminent or life-threatening  "deterioration of the following conditions: trauma.  Critical care was time spent personally by me on the following activities: blood draw for specimens, development of treatment plan with patient or surrogate, discussions with consultants, interpretation of cardiac output measurements, evaluation of patient's response to treatment, examination of patient, ordering and performing treatments and interventions, obtaining history from patient or surrogate, ordering and review of laboratory studies, ordering and review of radiographic studies, pulse oximetry, re-evaluation of patient's condition and review of old charts.        ED Vital Signs:  Vitals:    12/09/24 1312 12/09/24 1435 12/09/24 1507 12/09/24 1633   BP: (!) 141/76 138/76 (!) 145/72 (!) 142/70   Pulse: 80 75 68 69   Resp: 18 15 20    Temp: 97.6 °F (36.4 °C)      TempSrc: Oral      SpO2: 99% (!) 93% 99% 98%   Weight: 61.2 kg (135 lb)      Height: 5' 5" (1.651 m)       12/09/24 1646 12/09/24 1700   BP:  134/81   Pulse:  72   Resp:     Temp:     TempSrc:     SpO2:  (!) 94%   Weight: 61.2 kg (134 lb 14.7 oz)    Height:         Abnormal Lab Results:  Labs Reviewed   CBC W/ AUTO DIFFERENTIAL - Abnormal       Result Value    WBC 9.55      RBC 4.64      Hemoglobin 14.0      Hematocrit 43.4      MCV 94      MCH 30.2      MCHC 32.3      RDW 15.6 (*)     Platelets 1,056 (*)     MPV 9.1 (*)     Immature Granulocytes 0.3      Gran # (ANC) 6.6      Immature Grans (Abs) 0.03      Lymph # 2.1      Mono # 0.5      Eos # 0.3      Baso # 0.10      nRBC 0      Gran % 69.0      Lymph % 21.8      Mono % 5.3      Eosinophil % 2.6      Basophil % 1.0      Platelet Estimate Increased (*)     Differential Method Automated      Narrative:     plt   critical result(s) called and verbal readback obtained from   slime marroquin rn by [email or web address] 12/09/2024 15:48   COMPREHENSIVE METABOLIC PANEL - Abnormal    Sodium 146 (*)     Potassium 3.9      Chloride 108      CO2 24      Glucose 82      " BUN 28 (*)     Creatinine 0.8      Calcium 9.9      Total Protein 7.2      Albumin 3.9      Total Bilirubin 0.4      Alkaline Phosphatase 73      AST 34      ALT 23      eGFR >60      Anion Gap 14     CK - Abnormal     (*)    HEPATITIS C ANTIBODY    Hepatitis C Ab Negative      Narrative:     Release to patient->Immediate   HEP C VIRUS HOLD SPECIMEN    HEP C Virus Hold Specimen Hold for HCV sendout      Narrative:     Release to patient->Immediate   HIV 1 / 2 ANTIBODY    HIV 1/2 Ag/Ab Negative      Narrative:     Release to patient->Immediate   TROPONIN I    Troponin I <0.006     B-TYPE NATRIURETIC PEPTIDE    BNP 79     MAGNESIUM    Magnesium 2.4     URINALYSIS, REFLEX TO URINE CULTURE    Specimen UA Urine, Catheterized      Color, UA Yellow      Appearance, UA Clear      pH, UA 7.0      Specific Gravity, UA 1.015      Protein, UA Negative      Glucose, UA Negative      Ketones, UA Negative      Bilirubin (UA) Negative      Occult Blood UA Negative      Nitrite, UA Negative      Urobilinogen, UA Negative      Leukocytes, UA Negative      Narrative:     Specimen Source->Urine   DRUG SCREEN PANEL, URINE EMERGENCY        All Lab Results:  Results for orders placed or performed during the hospital encounter of 12/09/24   Hepatitis C Antibody    Collection Time: 12/09/24  3:38 PM   Result Value Ref Range    Hepatitis C Ab Negative Negative   HCV Virus Hold Specimen    Collection Time: 12/09/24  3:38 PM   Result Value Ref Range    HEP C Virus Hold Specimen Hold for HCV sendout    HIV 1/2 Ag/Ab (4th Gen)    Collection Time: 12/09/24  3:38 PM   Result Value Ref Range    HIV 1/2 Ag/Ab Negative Negative   CBC auto differential    Collection Time: 12/09/24  3:38 PM   Result Value Ref Range    WBC 9.55 3.90 - 12.70 K/uL    RBC 4.64 4.00 - 5.40 M/uL    Hemoglobin 14.0 12.0 - 16.0 g/dL    Hematocrit 43.4 37.0 - 48.5 %    MCV 94 82 - 98 fL    MCH 30.2 27.0 - 31.0 pg    MCHC 32.3 32.0 - 36.0 g/dL    RDW 15.6 (H) 11.5 - 14.5  %    Platelets 1,056 (HH) 150 - 450 K/uL    MPV 9.1 (L) 9.2 - 12.9 fL    Immature Granulocytes 0.3 0.0 - 0.5 %    Gran # (ANC) 6.6 1.8 - 7.7 K/uL    Immature Grans (Abs) 0.03 0.00 - 0.04 K/uL    Lymph # 2.1 1.0 - 4.8 K/uL    Mono # 0.5 0.3 - 1.0 K/uL    Eos # 0.3 0.0 - 0.5 K/uL    Baso # 0.10 0.00 - 0.20 K/uL    nRBC 0 0 /100 WBC    Gran % 69.0 38.0 - 73.0 %    Lymph % 21.8 18.0 - 48.0 %    Mono % 5.3 4.0 - 15.0 %    Eosinophil % 2.6 0.0 - 8.0 %    Basophil % 1.0 0.0 - 1.9 %    Platelet Estimate Increased (A)     Differential Method Automated    Comprehensive metabolic panel    Collection Time: 12/09/24  3:38 PM   Result Value Ref Range    Sodium 146 (H) 136 - 145 mmol/L    Potassium 3.9 3.5 - 5.1 mmol/L    Chloride 108 95 - 110 mmol/L    CO2 24 23 - 29 mmol/L    Glucose 82 70 - 110 mg/dL    BUN 28 (H) 8 - 23 mg/dL    Creatinine 0.8 0.5 - 1.4 mg/dL    Calcium 9.9 8.7 - 10.5 mg/dL    Total Protein 7.2 6.0 - 8.4 g/dL    Albumin 3.9 3.5 - 5.2 g/dL    Total Bilirubin 0.4 0.1 - 1.0 mg/dL    Alkaline Phosphatase 73 40 - 150 U/L    AST 34 10 - 40 U/L    ALT 23 10 - 44 U/L    eGFR >60 >60 mL/min/1.73 m^2    Anion Gap 14 8 - 16 mmol/L   Troponin I #1    Collection Time: 12/09/24  3:38 PM   Result Value Ref Range    Troponin I <0.006 0.000 - 0.026 ng/mL   BNP    Collection Time: 12/09/24  3:38 PM   Result Value Ref Range    BNP 79 0 - 99 pg/mL   CPK    Collection Time: 12/09/24  3:38 PM   Result Value Ref Range     (H) 20 - 180 U/L   Magnesium    Collection Time: 12/09/24  3:38 PM   Result Value Ref Range    Magnesium 2.4 1.6 - 2.6 mg/dL   Urinalysis, Reflex to Urine Culture Urine, Catheterized    Collection Time: 12/09/24  4:59 PM    Specimen: Urine   Result Value Ref Range    Specimen UA Urine, Catheterized     Color, UA Yellow Yellow, Straw, Sherri    Appearance, UA Clear Clear    pH, UA 7.0 5.0 - 8.0    Specific Gravity, UA 1.015 1.005 - 1.030    Protein, UA Negative Negative    Glucose, UA Negative Negative     Ketones, UA Negative Negative    Bilirubin (UA) Negative Negative    Occult Blood UA Negative Negative    Nitrite, UA Negative Negative    Urobilinogen, UA Negative <2.0 EU/dL    Leukocytes, UA Negative Negative         Imaging Results:  Imaging Results              X-Ray Chest AP Portable (In process)                      X-Ray Hips Bilateral 2 View Incl AP Pelvis (Final result)  Result time 12/09/24 16:09:21      Final result by Constanza Amaro MD (12/09/24 16:09:21)                   Impression:      Three-view exam    No comparison relevant imaging    No acute fracture or dislocation identified.  Arthritic change mild to moderate greater right hip.  No aggressive bony finding.      Electronically signed by: Constanza Amaro  Date:    12/09/2024  Time:    16:09               Narrative:    EXAMINATION:  XR HIPS BILATERAL 2 VIEW INCL AP PELVIS    CLINICAL HISTORY:  Pain in right hip                                       CT Cervical Spine Without Contrast (Final result)  Result time 12/09/24 16:02:26      Final result by Constanza Amaro MD (12/09/24 16:02:26)                   Impression:      There is a positive unstable fracture of the dens with 8 mm anterior displacement and associated moderate narrowing of the cervical canal.  Bones are demineralized and multilevel severe spondylosis.  There is retrolisthesis of C5 grade 1.    Critical findings discussed with Dr. Biggs at 15:53      Electronically signed by: Constanza Amaro  Date:    12/09/2024  Time:    16:02               Narrative:    EXAMINATION:  CT CERVICAL SPINE WITHOUT CONTRAST    CLINICAL HISTORY:  Neck trauma (Age >= 65y);    TECHNIQUE:  Low dose axial images, sagittal and coronal reformations were performed though the cervical spine.  Contrast was not administered.    COMPARISON:  None                                       CT Head Without Contrast (Final result)  Result time 12/09/24 15:48:28      Final result by Constanza Amaro MD  (12/09/24 15:48:28)                   Impression:      No acute abnormality.      Electronically signed by: Constanza Amaro  Date:    12/09/2024  Time:    15:48               Narrative:    EXAMINATION:  CT HEAD WITHOUT CONTRAST    CLINICAL HISTORY:  Head trauma, minor (Age >= 65y);    TECHNIQUE:  Low dose axial CT images obtained throughout the head without intravenous contrast. Sagittal and coronal reconstructions were performed.    COMPARISON:  None.    FINDINGS:  Intracranial compartment:    Ventricles and sulci are normal in size for age without evidence of hydrocephalus. No extra-axial blood or fluid collections.    Chronic appearing parenchymal changes no parenchymal mass, hemorrhage, edema or major vascular distribution infarct.    Skull/extracranial contents (limited evaluation): No fracture. Mastoid air cells and paranasal sinuses are essentially clear.                                       The EKG was ordered, reviewed, and independently interpreted by the ED provider.  Interpretation time: 4:44 PM  Rate: 69 BPM  Rhythm: normal sinus rhythm  Interpretation: Left axis deviation. No STEMI.           The Emergency Provider reviewed the vital signs and test results, which are outlined above.     ED Discussion   16:00:  Cervical Collar placed on patient after CT cervical spine results noting unstable dens fracture.  Patient updated on imaging findings and need for transfer for ayana    4:34 PM: Consult with Dr. Koo (Emergency Medicine) at [facility] concerning pt. There are no neurosurgical services, which the patient requires, offered at [facility] at this time. Dr. Koo expresses understanding and will accept transfer for Neurosurgical Services  Accepting Facility: [facility] ED  Accepting Physician: Dr. Koo     4:34 PM: Re-evaluated pt. Discussed with pt imaging results and need for transfer.  Pt has a cervical collar in place and is moving all extremities. Pt now notes her neck pain started  months ago following a fall. Informed pt and family that there are no neurological services available at this time. I have discussed test results, shared treatment plan, and the need for transfer with patient and family at bedside. All historical, clinical, radiographic, and laboratory findings were reviewed with the patient/family in detail. [facility]-collar to remain on at all times. Patient understands that there could be unforeseen motor vehicle accidents or loss of vital signs that could result in potential death or permanent disability. Pt and family express understanding at this time and agree with all information. All questions answered. Pt and family have no further questions or concerns at this time. Pt is ready for transfer.        Medical Decision Making  DDX: 1. Traumatic Head Injury 2. Cervical injury 3. Hip Fx vs Strain 4. Infection 5. Dehydration 6. Substance abuse    ECG reviewed and no acute ischemic changes, lab work reviewed and wbc normal, h/h normal, has chronic thrombocytosis, BUN slightly elevated creatinine normal, trop and bnp normal, CPK mildly elevated, CT head normal, xray pelvis and hips otherwise negative for acute findings, CT c-spine noted to have unstable dens fx, cervical collar in place, we do not have neurosurgical services, transfer accepted to [facility] for neurosurgical services, patient has remained clinically stable otherwise with neurological function intact.     Amount and/or Complexity of Data Reviewed  Labs: ordered. Decision-making details documented in ED Course.  Radiology: ordered. Decision-making details documented in ED Course.     Details: Patient with unstable dens fx noted on CT cervical spine, cervical collar placed  ECG/medicine tests: ordered and independent interpretation performed. Decision-making details documented in ED Course.  Discussion of management or test interpretation with external provider(s): 4:34 PM:  Consult with Dr. Koo (Emergency Medicine) at [facility] concerning pt. There are no neurosurgical services, which the patient requires, offered at [facility] at this time. Dr. Koo expresses understanding and will accept transfer for neurosurgical.  Accepting Facility: [facility] ED  Accepting Physician: Dr. Koo       Risk  Decision regarding hospitalization.  Diagnosis or treatment significantly limited by social determinants of health.       Additional MDM:   Smoking Cessation: The patient is a smoker. The patient was counseled on smoking cessation for: 4 minutes. The patient was counseled on tobacco related  health complications.           ED Medication(s):  Medications - No data to display    New Prescriptions    No medications on file               Scribe Attestation:   Scribe #1: I performed the above scribed service and the documentation accurately describes the services I performed. I attest to the accuracy of the note.     Attending:   Physician Attestation Statement for Scribe #1: I, Marjorie Escobedo MD, personally performed the services described in this documentation, as scribed by Shane Cifuentes, in my presence, and it is both accurate and complete.           Clinical Impression       ICD-10-CM ICD-9-CM   1. Closed odontoid fracture, initial encounter  S12.100A 805.02   2. Weakness  R53.1 780.79   3. Bilateral hip pain  M25.551 719.45    M25.552    4. Tobacco abuse  Z72.0 305.1   5. History of fall  Z91.81 V15.88   6. Contusion of forehead, initial encounter  S00.83XA 920   7. Thrombocytosis  D75.839 238.71       Disposition:   Disposition: Transferred  Condition: Serious       Marjorie Escobedo MD  12/09/24 2866

## 2025-04-19 ENCOUNTER — NURSE TRIAGE (OUTPATIENT)
Dept: ADMINISTRATIVE | Facility: CLINIC | Age: 70
End: 2025-04-19
Payer: MEDICARE

## 2025-04-19 ENCOUNTER — PATIENT OUTREACH (OUTPATIENT)
Facility: OTHER | Age: 70
End: 2025-04-19
Payer: MEDICARE

## 2025-04-19 ENCOUNTER — OCHSNER VIRTUAL EMERGENCY DEPARTMENT (OUTPATIENT)
Facility: CLINIC | Age: 70
End: 2025-04-19
Payer: MEDICARE

## 2025-04-19 DIAGNOSIS — S91.339A PUNCTURE WOUND OF FOOT, UNSPECIFIED LATERALITY, INITIAL ENCOUNTER: Primary | ICD-10-CM

## 2025-04-19 NOTE — PROGRESS NOTES
"Patient spoke to [facility] RN on call nurse to report the following, "Pt stepped on two theodora nails 2 days ago. Was not seen after the incident. Pt has not had a tetanus shot in over 30 years. She is c/o swelling to foot, ankle, and leg. Pt denies fever, n/v. Unknown if there is discoloration or drainage to foot because pt has a bandage and sock in place. I asked her to remove and look at it and she declines because she says it is too hard to get it back on. She says the portion of her leg above the sock is discolored, was darker bluish in color and has improved but still discolored. She is able to walk on her heel only but states it is painful to bear weight."    [facility] RN on call nurse consulted with [facility] MD on call, Dr. Dannie Fatima, and disposition was for patient to be seen in the ED. Follow up scheduled 04/21/2025 to outreach the patient to assess for any additional needs/concerns following ED visit.     "

## 2025-04-19 NOTE — PLAN OF CARE-OVED
[facility] Emergency Department Plan of Care Note  Referral Source: Nurse On-Call                          Referral Comment: January Tellez    Chief Complaint   Patient presents with    Trauma     Stepped on theodora nails 2 days ago, and now with new discoloration reportedly moving proximally up affected extremity. Tetanus out of date.       Recommendation: Emergency Department            Emergency Department: Nica Rosario          Recommendation comment: for eval of possible worsening infection and tetanus update.    Encounter Diagnosis   Name Primary?    Puncture wound of foot, unspecified laterality, initial encounter Yes

## 2025-04-19 NOTE — TELEPHONE ENCOUNTER
"Pt stepped on two theodora nails 2 days ago. Was not seen after the incident. Pt has not had a tetanus shot in over 30 years. She is c/o swelling to foot, ankle, and leg. Pt denies fever, n/v. Unknown if there is discoloration or drainage to foot because pt has a bandage and sock in place. I asked her to remove and look at it and she declines because she says it is too hard to get it back on. She says the portion of her leg above the sock is discolored, was darker bluish in color and has improved but still discolored. She is able to walk on her heel only but states it is painful to bear weight. Care advice is to go to ED/[facility] now (or pcp triage). Jordon Dr Fatima contacted. "needs to be seen today. Probably better to go to the ED looking at her chart and social structure." Advised pt of recommendation and she states she does not have transportation. Encouraged pt to call EMS for transport if no one else can bring her. Emphasized risk of tetanus infection, bone infection, sepsis. Pt verbalizes understanding.   No [facility] PCP to route call to.               Reason for Disposition   Sounds like a serious injury to the triager    Additional Information   Negative: [1] Puncture wound of head, neck, chest, back, or abdomen AND [2] sounds life-threatening to the triager   Negative: Shock suspected (e.g., cold/pale/clammy skin, too weak to stand, low BP, rapid pulse)   Negative: Sounds like a life-threatening emergency to the triager   Negative: [1] Puncture wound of head, neck, chest, abdomen, or overlying a joint AND [2] could be deep   Negative: High pressure injection injury (e.g., from grease gun or paint gun, usually work-related)    Protocols used: Puncture Wound-A-AH    "

## 2025-04-22 ENCOUNTER — PATIENT OUTREACH (OUTPATIENT)
Facility: OTHER | Age: 70
End: 2025-04-22
Payer: MEDICARE

## 2025-04-22 NOTE — PROGRESS NOTES
Patient was advised to go to the Emergency Department on 4/19/25. No Emergency Department encounter is noted. A follow-up call was placed to assess additional needs. The patient stated that she was unable to go to the ED. She reported that the puncture wound on her foot is draining and is less swollen than initially, but it remains swollen. She inquired about the need for a tetanus shot and was advised that tetanus is a life-threatening emergency, and for her safety, it is best to receive the tetanus shot. The patient expressed reluctance to call an ambulance and stated that she believes she can get a ride to urgent care today. Assistance was provided in locating the nearest urgent care to her location, and an email was sent to her email address on file with the address. A follow-up call is scheduled for 4/23/25 to assess additional needs.

## 2025-04-24 ENCOUNTER — PATIENT OUTREACH (OUTPATIENT)
Facility: OTHER | Age: 70
End: 2025-04-24
Payer: MEDICARE

## 2025-04-24 NOTE — PROGRESS NOTES
Patient was advised to go to the Emergency Department or Urgent Care for a tetanus shot. No encounter was noted. A follow-up call was made to assess for additional needs. There was no answer, and a message was left asking for a return call. Assistance will be provided as needed if the patient returns the call.